# Patient Record
Sex: FEMALE | Race: WHITE
[De-identification: names, ages, dates, MRNs, and addresses within clinical notes are randomized per-mention and may not be internally consistent; named-entity substitution may affect disease eponyms.]

---

## 2018-12-05 ENCOUNTER — APPOINTMENT (OUTPATIENT)
Dept: ANTEPARTUM | Facility: CLINIC | Age: 38
End: 2018-12-05
Payer: MEDICAID

## 2018-12-05 ENCOUNTER — ASOB RESULT (OUTPATIENT)
Age: 38
End: 2018-12-05

## 2018-12-05 PROBLEM — Z00.00 ENCOUNTER FOR PREVENTIVE HEALTH EXAMINATION: Status: ACTIVE | Noted: 2018-12-05

## 2018-12-05 PROCEDURE — 76816 OB US FOLLOW-UP PER FETUS: CPT

## 2019-01-16 ENCOUNTER — ASOB RESULT (OUTPATIENT)
Age: 39
End: 2019-01-16

## 2019-01-16 ENCOUNTER — APPOINTMENT (OUTPATIENT)
Age: 39
End: 2019-01-16
Payer: MEDICAID

## 2019-01-16 PROCEDURE — 76817 TRANSVAGINAL US OBSTETRIC: CPT

## 2019-01-16 PROCEDURE — 76811 OB US DETAILED SNGL FETUS: CPT

## 2019-01-21 PROBLEM — Z82.49 FAMILY HISTORY OF HYPERTENSION: Status: ACTIVE | Noted: 2019-01-21

## 2019-01-21 RX ORDER — MULTIVIT-MIN/FOLIC/VIT K/LYCOP 400-300MCG
28-0.8 TABLET ORAL DAILY
Refills: 0 | Status: ACTIVE | COMMUNITY
Start: 2019-01-21

## 2019-01-22 ENCOUNTER — APPOINTMENT (OUTPATIENT)
Dept: ANTEPARTUM | Facility: CLINIC | Age: 39
End: 2019-01-22

## 2019-01-22 ENCOUNTER — APPOINTMENT (OUTPATIENT)
Dept: MATERNAL FETAL MEDICINE | Facility: CLINIC | Age: 39
End: 2019-01-22
Payer: MEDICAID

## 2019-01-22 VITALS
RESPIRATION RATE: 16 BRPM | WEIGHT: 209.38 LBS | OXYGEN SATURATION: 98 % | HEIGHT: 62 IN | HEART RATE: 89 BPM | DIASTOLIC BLOOD PRESSURE: 70 MMHG | SYSTOLIC BLOOD PRESSURE: 120 MMHG | BODY MASS INDEX: 38.53 KG/M2

## 2019-01-22 DIAGNOSIS — Z86.39 PERSONAL HISTORY OF OTHER ENDOCRINE, NUTRITIONAL AND METABOLIC DISEASE: ICD-10-CM

## 2019-01-22 DIAGNOSIS — Z82.49 FAMILY HISTORY OF ISCHEMIC HEART DISEASE AND OTHER DISEASES OF THE CIRCULATORY SYSTEM: ICD-10-CM

## 2019-01-22 DIAGNOSIS — Z86.79 PERSONAL HISTORY OF OTHER DISEASES OF THE CIRCULATORY SYSTEM: ICD-10-CM

## 2019-01-22 DIAGNOSIS — O99.210 OBESITY COMPLICATING PREGNANCY, UNSPECIFIED TRIMESTER: ICD-10-CM

## 2019-01-22 PROCEDURE — 99205 OFFICE O/P NEW HI 60 MIN: CPT | Mod: TH

## 2019-01-22 NOTE — DISCUSSION/SUMMARY
[FreeTextEntry1] : Increased synthroid to 50mcg daily.\par \par Repeat TFT today\par \par Followup is scheduled in 3-4 weeks for sonogram and reevaluation of the TFT

## 2019-01-22 NOTE — ACTIVE PROBLEMS
[Diabetes Mellitus] : no diabetes mellitus [Hypertension] : no hypertension [Heart Disease] : no heart disease [Autoimmune Disease] : no autoimmune disease [Renal Disease] : no kidney disease, no UTI [Neurologic Disorder] : no neurologic disorder, no epilepsy [Psychiatric Disorders] : no psychiatric disorders [Depression] : no depression, no post partum depression [Hepatic Disorder] : no hepatitis, no liver disease [Thrombophlebitis] : no varicosities, no phlebitis [Trauma] : no trauma/violence [Blood Transfusion (___ Ml)] : no history of blood transfusion

## 2019-01-22 NOTE — HISTORY OF PRESENT ILLNESS
[FreeTextEntry1] : Lizbet has a known history of hypopthyroidism, currently taking 25mcg synthroid daily.  Her most recent thyroid panel shows an slighly elevated TSH and normal free T4, however this was from over 2 months ago.

## 2019-01-22 NOTE — FAMILY HISTORY
[Age 35+ During Pregnancy] : not 35 or over during pregnancy [Reported Family History Of Birth Defects] : no congenital heart defects [Ryan-Sachs Carrier] : no Ryan-Sachs [Family History] : no mental retardation/autism [Reported Family History Of Genetic Disease] : no history of child defect in child of baby father

## 2019-01-22 NOTE — VITALS
[LMP (date): ___] : LMP was on [unfilled] [GA =___ Weeks] : which calculates to a GA of [unfilled] weeks [GA= ___ Days] : and [unfilled] day(s) [RICKY by LMP (date): ___] : The calculated RICKY by LMP is [unfilled] [By LMP] : this is the final RICKY

## 2019-01-22 NOTE — DATA REVIEWED
[FreeTextEntry1] : We reviewed all Desyis labs today, and ordered a repeat of the thyroid panel\par \par A followup sonogram is scheduled.

## 2019-01-22 NOTE — OB HISTORY
[Pregnancy History] : patient did not receive anesthesia [___] : no pregnancy complications reported [LMP: ___] : LMP: [unfilled] [RICKY: ___] : RICKY: [unfilled] [EGA: ___ wks] : EGA: [unfilled] wks [Spontaneous] : Spontaneous conception [Definite:  ___ (Date)] : the last menstrual period was [unfilled]

## 2019-01-23 LAB
T3FREE SERPL-MCNC: 2.77 PG/ML
T4 FREE SERPL-MCNC: 0.9 NG/DL
TSH SERPL-ACNC: 4.79 UIU/ML

## 2019-02-20 ENCOUNTER — ASOB RESULT (OUTPATIENT)
Age: 39
End: 2019-02-20

## 2019-02-20 ENCOUNTER — APPOINTMENT (OUTPATIENT)
Dept: MATERNAL FETAL MEDICINE | Facility: CLINIC | Age: 39
End: 2019-02-20
Payer: MEDICAID

## 2019-02-20 ENCOUNTER — APPOINTMENT (OUTPATIENT)
Age: 39
End: 2019-02-20

## 2019-02-20 DIAGNOSIS — E03.9 ENDOCRINE, NUTRITIONAL AND METABOLIC DISEASES COMPLICATING PREGNANCY, UNSPECIFIED TRIMESTER: ICD-10-CM

## 2019-02-20 DIAGNOSIS — O99.280 ENDOCRINE, NUTRITIONAL AND METABOLIC DISEASES COMPLICATING PREGNANCY, UNSPECIFIED TRIMESTER: ICD-10-CM

## 2019-02-20 PROCEDURE — 76816 OB US FOLLOW-UP PER FETUS: CPT

## 2019-02-20 PROCEDURE — 99215 OFFICE O/P EST HI 40 MIN: CPT | Mod: TH

## 2019-02-20 PROCEDURE — 76815 OB US LIMITED FETUS(S): CPT | Mod: 59

## 2019-02-20 NOTE — DATA REVIEWED
[FreeTextEntry1] : Sonogram today shows the baby at the 75th percentile. \par \par I reviewed the TFT with Lizbet.  She is currently Euthyroid.

## 2019-02-20 NOTE — HISTORY OF PRESENT ILLNESS
[FreeTextEntry1] : Lizbet is being followed for hypothyroidism, currently om 50mcg synthroid. Her most recent TFT shows borderline low thyroid function, and a followup TFT is warranted to decide if/when to increase the synthroid

## 2019-02-20 NOTE — DISCUSSION/SUMMARY
[FreeTextEntry1] : Repeat sonogram for growth in 4 weeks. Repeat TFTs to be drawn at that time. \par \par

## 2019-03-19 ENCOUNTER — APPOINTMENT (OUTPATIENT)
Dept: MATERNAL FETAL MEDICINE | Facility: CLINIC | Age: 39
End: 2019-03-19
Payer: MEDICAID

## 2019-03-19 ENCOUNTER — APPOINTMENT (OUTPATIENT)
Dept: ANTEPARTUM | Facility: CLINIC | Age: 39
End: 2019-03-19
Payer: MEDICAID

## 2019-03-19 ENCOUNTER — ASOB RESULT (OUTPATIENT)
Age: 39
End: 2019-03-19

## 2019-03-19 VITALS
BODY MASS INDEX: 39.38 KG/M2 | SYSTOLIC BLOOD PRESSURE: 122 MMHG | DIASTOLIC BLOOD PRESSURE: 68 MMHG | HEIGHT: 62 IN | HEART RATE: 88 BPM | OXYGEN SATURATION: 98 % | RESPIRATION RATE: 16 BRPM | WEIGHT: 214 LBS

## 2019-03-19 VITALS
WEIGHT: 214 LBS | DIASTOLIC BLOOD PRESSURE: 68 MMHG | HEART RATE: 88 BPM | BODY MASS INDEX: 39.38 KG/M2 | HEIGHT: 62 IN | SYSTOLIC BLOOD PRESSURE: 122 MMHG

## 2019-03-19 DIAGNOSIS — E66.9 OBESITY, UNSPECIFIED: ICD-10-CM

## 2019-03-19 PROCEDURE — 76816 OB US FOLLOW-UP PER FETUS: CPT

## 2019-03-19 PROCEDURE — 99214 OFFICE O/P EST MOD 30 MIN: CPT | Mod: TH

## 2019-03-19 PROCEDURE — 76819 FETAL BIOPHYS PROFIL W/O NST: CPT

## 2019-03-19 RX ORDER — LEVOTHYROXINE SODIUM 0.05 MG/1
50 TABLET ORAL DAILY
Qty: 30 | Refills: 2 | Status: DISCONTINUED | COMMUNITY
Start: 2019-01-22 | End: 2019-03-19

## 2019-03-19 NOTE — ACTIVE PROBLEMS
[Diabetes Mellitus] : no diabetes mellitus [Hypertension] : no hypertension [Autoimmune Disease] : no autoimmune disease [Heart Disease] : no heart disease [Neurologic Disorder] : no neurologic disorder, no epilepsy [Psychiatric Disorders] : no psychiatric disorders [Renal Disease] : no kidney disease, no UTI [Hepatic Disorder] : no hepatitis, no liver disease [Depression] : no depression, no post partum depression [Thrombophlebitis] : no varicosities, no phlebitis [Trauma] : no trauma/violence [Blood Transfusion (___ Ml)] : no history of blood transfusion

## 2019-03-19 NOTE — FAMILY HISTORY
[Age 35+ During Pregnancy] : not 35 or over during pregnancy [Reported Family History Of Birth Defects] : no neural tube defect [Ryan-Sachs Carrier] : no Ryan-Sachs [Family History] : no mental retardation/autism [Reported Family History Of Genetic Disease] : no history of child defect in child of baby father

## 2019-03-19 NOTE — HISTORY OF PRESENT ILLNESS
[FreeTextEntry1] : She told me she was diagnosed with hypothyroidism approximately 2 years ago. She was taking levothyroxine 25 mcg daily before pregnancy. The Levothyroxine medication was increased  to 50 mcg daily on January 22, 2019 due to prior thyroid function studies which showed an elevated TSH level and low-normal free T4 level.. She had thyroid function studies done on January 22, 2019 that reported an elevated TSH level and a free T4 level at the lower limit of normal. No subsequent thyroid function studies have been done

## 2019-03-19 NOTE — DISCUSSION/SUMMARY
[FreeTextEntry1] : She's 30 weeks by her last menstrual period dates.\par \par She is overweight and obesity has been associated with a number of maternal complications such as gestational diabetes, pre-eclampsia, thrombophlebitis, labor abnormalities, post-term pregnancies,  delivery, and operative complications. Obesity has been associated with adverse fetal outcomes such as late stillbirth and  deliveries.  Obese women also have a two to three-fold increased incidence in congenital anomalies. There were no major fetal malformations seen during the fetal anatomy ultrasound examination. She told me that she had a one-hour Glucola challenge test to screen for gestational diabetes last week and the results are pending.\par \par She is currently taking 50 mcg of levothyroxine daily. I told her that pregnancies complicated by hypothyroidism are at an increased risk for stillbirths. She denies feeling tired or fatigue, having constipation, recent weight gain, and having severe dry skin. She was advised to continue have serial thyroid function studies during pregnancy to document that she is euthyroid or adjust her levothyroxine medication. I ordered a free T4, free T3, and TSH level.\par \par Regarding her advanced maternal age, she did not have genetic counseling.  She told me that she does not remember being informed of her risks for having a baby with Down syndrome. she had test done\par \par I told her that advanced maternal age has been associated with a higher incidence of gestational diabetes, and preeclampsia /eclampsia. She should be closely monitored for these conditions during pregnancy.  I also told her that advanced maternal age has been associated with an increased risk of stillbirth, and therefore, I recommend delivery during the 39 week of gestation in the event she has not given birth by 39 weeks of gestation. \par \par \par

## 2019-03-19 NOTE — PAST MEDICAL HISTORY
[HIV Infection] : no HIV [Chlamydial Infections] : no chlamydia [Syphilis] : no syphilis [Exposure To Gonorrhea] : no gonorrhea [Hepatitis, B Virus] : no Hepatitis B [Human Papilloma Virus Infection] : no genital warts [Herpes Simplex] : no genital herpes [Trichomoniasis] : no trichomoniasis [Hepatitis, C Virus] : no Hepatitis C

## 2019-03-19 NOTE — VITALS
[LMP (date): ___] : LMP was on [unfilled] [GA =___ Weeks] : which calculates to a GA of [unfilled] weeks [RICKY by LMP (date): ___] : The calculated RICKY by LMP is [unfilled] [GA= ___ Days] : and [unfilled] day(s) [By LMP] : this is the final RICKY

## 2019-03-19 NOTE — OB HISTORY
[LMP: ___] : LMP: [unfilled] [RICKY: ___] : RICKY: [unfilled] [EGA: ___ wks] : EGA: [unfilled] wks [Spontaneous] : Spontaneous conception [Definite:  ___ (Date)] : the last menstrual period was [unfilled] [___] : no pregnancy complications reported [Pregnancy History] : patient did not receive anesthesia [FreeTextEntry1] : Prenatal records were not available for my review.\par \par She had two prior MFM consultations in this office with Dr. Guerra (January 22, 2019 and February 20, 2019).

## 2019-03-19 NOTE — SURGICAL HISTORY
[Last Pap: ___] : Last Pap: [unfilled] [Fibroids] : no fibroids [Breast Disease] : no breast disease [Abn Paps] : no abnormal pap smears [STI's] : no STI's [Infertility] : no infertility [Cysts] : no cysts [OC Use] : no OC use [Last Mammo: ___] : Last Mammo: none spouse

## 2019-03-20 ENCOUNTER — MEDICATION RENEWAL (OUTPATIENT)
Age: 39
End: 2019-03-20

## 2019-03-20 ENCOUNTER — OTHER (OUTPATIENT)
Age: 39
End: 2019-03-20

## 2019-03-20 ENCOUNTER — APPOINTMENT (OUTPATIENT)
Dept: ANTEPARTUM | Facility: CLINIC | Age: 39
End: 2019-03-20

## 2019-03-20 ENCOUNTER — APPOINTMENT (OUTPATIENT)
Dept: MATERNAL FETAL MEDICINE | Facility: CLINIC | Age: 39
End: 2019-03-20

## 2019-03-20 LAB
T3FREE SERPL-MCNC: 2.93 PG/ML
T4 FREE SERPL-MCNC: 0.8 NG/DL
TSH SERPL-ACNC: 3.35 UIU/ML

## 2019-03-20 RX ORDER — LEVOTHYROXINE SODIUM 0.07 MG/1
75 TABLET ORAL DAILY
Qty: 30 | Refills: 2 | Status: ACTIVE | COMMUNITY
Start: 2019-03-20 | End: 1900-01-01

## 2019-04-01 ENCOUNTER — OUTPATIENT (OUTPATIENT)
Dept: OUTPATIENT SERVICES | Facility: HOSPITAL | Age: 39
LOS: 1 days | End: 2019-04-01
Payer: MEDICAID

## 2019-04-01 PROCEDURE — G9001: CPT

## 2019-04-16 ENCOUNTER — APPOINTMENT (OUTPATIENT)
Dept: ANTEPARTUM | Facility: CLINIC | Age: 39
End: 2019-04-16
Payer: MEDICAID

## 2019-04-16 ENCOUNTER — ASOB RESULT (OUTPATIENT)
Age: 39
End: 2019-04-16

## 2019-04-16 ENCOUNTER — APPOINTMENT (OUTPATIENT)
Dept: MATERNAL FETAL MEDICINE | Facility: CLINIC | Age: 39
End: 2019-04-16
Payer: MEDICAID

## 2019-04-16 VITALS
BODY MASS INDEX: 39.56 KG/M2 | DIASTOLIC BLOOD PRESSURE: 78 MMHG | WEIGHT: 215 LBS | RESPIRATION RATE: 18 BRPM | HEIGHT: 62 IN | HEART RATE: 82 BPM | OXYGEN SATURATION: 99 % | SYSTOLIC BLOOD PRESSURE: 124 MMHG

## 2019-04-16 DIAGNOSIS — O09.523 SUPERVISION OF ELDERLY MULTIGRAVIDA, THIRD TRIMESTER: ICD-10-CM

## 2019-04-16 PROCEDURE — 99213 OFFICE O/P EST LOW 20 MIN: CPT | Mod: TH

## 2019-04-16 PROCEDURE — 76816 OB US FOLLOW-UP PER FETUS: CPT

## 2019-04-16 PROCEDURE — 76819 FETAL BIOPHYS PROFIL W/O NST: CPT

## 2019-04-16 RX ORDER — LEVOTHYROXINE SODIUM 0.03 MG/1
25 TABLET ORAL
Refills: 0 | Status: DISCONTINUED | COMMUNITY
End: 2019-04-16

## 2019-04-16 NOTE — VITALS
[GA =___ Weeks] : which calculates to a GA of [unfilled] weeks [GA= ___ Days] : and [unfilled] day(s) [RICKY by LMP (date): ___] : The calculated RICKY by LMP is [unfilled] [By LMP] : this is the final RICKY [LMP (date): ___] : LMP was on [unfilled]

## 2019-04-16 NOTE — OB HISTORY
[LMP: ___] : LMP: [unfilled] [RICKY: ___] : RICKY: [unfilled] [EGA: ___ wks] : EGA: [unfilled] wks [Spontaneous] : Spontaneous conception [Definite:  ___ (Date)] : the last menstrual period was [unfilled] [Pregnancy History] : patient did not receive anesthesia [___] : no pregnancy complications reported [FreeTextEntry1] : Prenatal records were not available for my review.\par \par She had two prior MFM consultations in this office with Dr. Guerra (January 22, 2019 and February 20, 2019).

## 2019-04-16 NOTE — DISCUSSION/SUMMARY
[FreeTextEntry1] : She's 34 weeks by her last menstrual period dates.\par \par She was seen by me on March 19, 2019. I ordered thyroid function studies which reported a free T4 level of 0.8 (low free T4). She was advised to increase the levothyroxine dose to 75 mcg daily on March 20, 2018. She told me that she has been taking 75 mcg of levothyroxine each day as recommended. She having constipation, recent weight gain, and having severe dry skin. She was advised to continue having serial thyroid function studies during pregnancy to document that she is euthyroid or adjust her levothyroxine medication. I ordered a free T4, free T3, and TSH level.\par \par She was advised to start weekly fetal testing at 36 weeks due to her hypothyroidism, advanced maternal age, and obesity. She was advised to have a fetal growth ultrasound examination in approximately 3-4 weeks.\par \par \par \par

## 2019-04-16 NOTE — FAMILY HISTORY
[Age 35+ During Pregnancy] : not 35 or over during pregnancy [Reported Family History Of Birth Defects] : no congenital heart defects [Ryan-Sachs Carrier] : no Ryan-Sachs [Family History] : no mental retardation/autism [Reported Family History Of Genetic Disease] : no maternal metabolic disorder

## 2019-04-17 DIAGNOSIS — Z71.89 OTHER SPECIFIED COUNSELING: ICD-10-CM

## 2019-04-30 ENCOUNTER — APPOINTMENT (OUTPATIENT)
Dept: ANTEPARTUM | Facility: CLINIC | Age: 39
End: 2019-04-30

## 2019-05-08 ENCOUNTER — APPOINTMENT (OUTPATIENT)
Dept: ANTEPARTUM | Facility: CLINIC | Age: 39
End: 2019-05-08
Payer: MEDICAID

## 2019-05-08 ENCOUNTER — ASOB RESULT (OUTPATIENT)
Age: 39
End: 2019-05-08

## 2019-05-08 PROCEDURE — 76818 FETAL BIOPHYS PROFILE W/NST: CPT

## 2019-05-15 ENCOUNTER — APPOINTMENT (OUTPATIENT)
Dept: ANTEPARTUM | Facility: CLINIC | Age: 39
End: 2019-05-15
Payer: MEDICAID

## 2019-05-15 ENCOUNTER — APPOINTMENT (OUTPATIENT)
Dept: MATERNAL FETAL MEDICINE | Facility: CLINIC | Age: 39
End: 2019-05-15
Payer: MEDICAID

## 2019-05-15 ENCOUNTER — ASOB RESULT (OUTPATIENT)
Age: 39
End: 2019-05-15

## 2019-05-15 VITALS
BODY MASS INDEX: 39.56 KG/M2 | SYSTOLIC BLOOD PRESSURE: 100 MMHG | RESPIRATION RATE: 18 BRPM | HEART RATE: 78 BPM | HEIGHT: 62 IN | DIASTOLIC BLOOD PRESSURE: 58 MMHG | WEIGHT: 215 LBS | OXYGEN SATURATION: 98 %

## 2019-05-15 VITALS
RESPIRATION RATE: 18 BRPM | OXYGEN SATURATION: 98 % | HEIGHT: 62 IN | HEART RATE: 78 BPM | DIASTOLIC BLOOD PRESSURE: 58 MMHG | SYSTOLIC BLOOD PRESSURE: 100 MMHG

## 2019-05-15 DIAGNOSIS — O99.283 ENDOCRINE, NUTRITIONAL AND METABOLIC DISEASES COMPLICATING PREGNANCY, THIRD TRIMESTER: ICD-10-CM

## 2019-05-15 DIAGNOSIS — O99.213 OBESITY COMPLICATING PREGNANCY, THIRD TRIMESTER: ICD-10-CM

## 2019-05-15 DIAGNOSIS — E03.9 ENDOCRINE, NUTRITIONAL AND METABOLIC DISEASES COMPLICATING PREGNANCY, THIRD TRIMESTER: ICD-10-CM

## 2019-05-15 DIAGNOSIS — O09.529 SUPERVISION OF ELDERLY MULTIGRAVIDA, UNSPECIFIED TRIMESTER: ICD-10-CM

## 2019-05-15 DIAGNOSIS — Z3A.38 38 WEEKS GESTATION OF PREGNANCY: ICD-10-CM

## 2019-05-15 PROCEDURE — 76816 OB US FOLLOW-UP PER FETUS: CPT

## 2019-05-15 PROCEDURE — 99214 OFFICE O/P EST MOD 30 MIN: CPT | Mod: TH

## 2019-05-15 PROCEDURE — 76819 FETAL BIOPHYS PROFIL W/O NST: CPT

## 2019-05-15 RX ORDER — CHLORHEXIDINE GLUCONATE 4 %
325 (65 FE) LIQUID (ML) TOPICAL
Refills: 0 | Status: ACTIVE | COMMUNITY

## 2019-05-15 NOTE — FAMILY HISTORY
[Age 35+ During Pregnancy] : not 35 or over during pregnancy [Ryan-Sachs Carrier] : no Ryan-Sachs [Reported Family History Of Birth Defects] : no congenital heart defects [Reported Family History Of Genetic Disease] : no maternal metabolic disorder [Family History] : no mental retardation/autism

## 2019-05-15 NOTE — SURGICAL HISTORY
[Last Pap: ___] : Last Pap: [unfilled] [Fibroids] : no fibroids [Abn Paps] : no abnormal pap smears [STI's] : no STI's [Breast Disease] : no breast disease [Infertility] : no infertility [OC Use] : no OC use [Last Mammo: ___] : Last Mammo: none [Cysts] : no cysts

## 2019-05-15 NOTE — VITALS
[LMP (date): ___] : LMP was on [unfilled] [RICKY by LMP (date): ___] : The calculated RICKY by LMP is [unfilled] [By LMP] : this is the final RICKY [GA =___ Weeks] : which calculates to a GA of [unfilled] weeks [GA= ___ Days] : and [unfilled] day(s)

## 2019-05-15 NOTE — PAST MEDICAL HISTORY
[Exposure To Gonorrhea] : no gonorrhea [Chlamydial Infections] : no chlamydia [HIV Infection] : no HIV [Human Papilloma Virus Infection] : no genital warts [Syphilis] : no syphilis [Herpes Simplex] : no genital herpes [Trichomoniasis] : no trichomoniasis [Hepatitis, B Virus] : no Hepatitis B [Hepatitis, C Virus] : no Hepatitis C

## 2019-05-15 NOTE — OB HISTORY
[RICKY: ___] : RICKY: [unfilled] [LMP: ___] : LMP: [unfilled] [Spontaneous] : Spontaneous conception [Definite:  ___ (Date)] : the last menstrual period was [unfilled] [Pregnancy History] : patient did not receive anesthesia [___] : no pregnancy complications reported [FreeTextEntry1] : Prenatal records were not available for my review.\par \par She had two MFM consultations in this office with Dr. Guerra (January 22, 2019 and February 20, 2019).  [EGA: ___ wks] : EGA: [unfilled] wks

## 2019-05-15 NOTE — ACTIVE PROBLEMS
[Diabetes Mellitus] : no diabetes mellitus [Heart Disease] : no heart disease [Hypertension] : no hypertension [Autoimmune Disease] : no autoimmune disease [Neurologic Disorder] : no neurologic disorder, no epilepsy [Renal Disease] : no kidney disease, no UTI [Hepatic Disorder] : no hepatitis, no liver disease [Depression] : no depression, no post partum depression [Psychiatric Disorders] : no psychiatric disorders [Thrombophlebitis] : no varicosities, no phlebitis [Blood Transfusion (___ Ml)] : no history of blood transfusion [Trauma] : no trauma/violence

## 2019-05-15 NOTE — HISTORY OF PRESENT ILLNESS
[FreeTextEntry1] : She told me she was diagnosed with hypothyroidism approximately 2 years ago. She was taking levothyroxine 25 mcg daily before pregnancy. The Levothyroxine medication was increased  to 50 mcg daily on January 22, 2019 due to prior thyroid function studies which showed an elevated TSH level and low-normal free T4 level.. She had thyroid function studies done on January 22, 2019 that reported an elevated TSH level and a free T4 level at the lower limit of normal.

## 2019-05-15 NOTE — DISCUSSION/SUMMARY
[FreeTextEntry1] : She's 38 weeks and 1 day gestation by her last menstrual period dates.\par \par She was seen by me on March 19, 2019 and April 16, 2019. She increased the levothyroxine dose to 75 mcg daily on March 20, 2018. She told me that she has been taking 75 mcg of levothyroxine each day as recommended. Thyroid function studies done on April 18, 2019 reported a normal free T4, free T3, and TSH level. She denies having constipation, recent weight gain, and having severe dry skin. She was advised to continue having serial thyroid function studies during pregnancy to document that she is euthyroid or adjust her levothyroxine medication. I ordered a free T4, free T3, and TSH level.\par \par She had an ultrasound examination today which reported an estimated fetal weight of 8 pounds or 3629 grams. A biophysical profile score was 8 out of 8 which is suggestive of a healthy fetus. She was advised to continue weekly fetal testing until delivery due to her hypothyroidism, advanced maternal age, and obesity.  I also told her that advanced maternal age has been associated with an increased risk of stillbirth, and therefore, I recommend delivery during the 39 week of gestation in the event she has not given birth by 39 weeks of gestation. All her questions were answered.\par \par \par \par

## 2019-05-21 ENCOUNTER — APPOINTMENT (OUTPATIENT)
Dept: ANTEPARTUM | Facility: CLINIC | Age: 39
End: 2019-05-21
Payer: MEDICAID

## 2019-05-21 ENCOUNTER — ASOB RESULT (OUTPATIENT)
Age: 39
End: 2019-05-21

## 2019-05-21 PROCEDURE — 76818 FETAL BIOPHYS PROFILE W/NST: CPT

## 2019-05-21 PROCEDURE — 76815 OB US LIMITED FETUS(S): CPT

## 2019-05-22 ENCOUNTER — INPATIENT (INPATIENT)
Facility: HOSPITAL | Age: 39
LOS: 2 days | Discharge: HOME | End: 2019-05-25
Attending: OBSTETRICS & GYNECOLOGY | Admitting: OBSTETRICS & GYNECOLOGY
Payer: COMMERCIAL

## 2019-05-22 VITALS — DIASTOLIC BLOOD PRESSURE: 79 MMHG | HEART RATE: 78 BPM | SYSTOLIC BLOOD PRESSURE: 121 MMHG

## 2019-05-22 DIAGNOSIS — E03.9 HYPOTHYROIDISM, UNSPECIFIED: ICD-10-CM

## 2019-05-22 DIAGNOSIS — O26.893 OTHER SPECIFIED PREGNANCY RELATED CONDITIONS, THIRD TRIMESTER: ICD-10-CM

## 2019-05-22 LAB
APPEARANCE UR: CLEAR — SIGNIFICANT CHANGE UP
BACTERIA # UR AUTO: NEGATIVE — SIGNIFICANT CHANGE UP
BASOPHILS # BLD AUTO: 0 K/UL — SIGNIFICANT CHANGE UP (ref 0–0.2)
BASOPHILS NFR BLD AUTO: 0.2 % — SIGNIFICANT CHANGE UP (ref 0–2)
BILIRUB UR-MCNC: NEGATIVE — SIGNIFICANT CHANGE UP
BLD GP AB SCN SERPL QL: SIGNIFICANT CHANGE UP
COLOR SPEC: YELLOW — SIGNIFICANT CHANGE UP
DIFF PNL FLD: NEGATIVE — SIGNIFICANT CHANGE UP
EOSINOPHIL # BLD AUTO: 0.1 K/UL — SIGNIFICANT CHANGE UP (ref 0–0.5)
EOSINOPHIL NFR BLD AUTO: 0.6 % — SIGNIFICANT CHANGE UP (ref 0–6)
EPI CELLS # UR: SIGNIFICANT CHANGE UP
GLUCOSE UR QL: NEGATIVE MG/DL — SIGNIFICANT CHANGE UP
HCT VFR BLD CALC: 34.6 % — LOW (ref 37–47)
HGB BLD-MCNC: 11.3 G/DL — LOW (ref 12–16)
KETONES UR-MCNC: NEGATIVE — SIGNIFICANT CHANGE UP
LEUKOCYTE ESTERASE UR-ACNC: ABNORMAL
LYMPHOCYTES # BLD AUTO: 2.1 K/UL — SIGNIFICANT CHANGE UP (ref 1–4.8)
LYMPHOCYTES # BLD AUTO: 21.7 % — SIGNIFICANT CHANGE UP (ref 20–55)
MCHC RBC-ENTMCNC: 29 PG — SIGNIFICANT CHANGE UP (ref 27–31)
MCHC RBC-ENTMCNC: 32.7 G/DL — SIGNIFICANT CHANGE UP (ref 32–36)
MCV RBC AUTO: 88.9 FL — SIGNIFICANT CHANGE UP (ref 81–99)
MONOCYTES # BLD AUTO: 1 K/UL — HIGH (ref 0–0.8)
MONOCYTES NFR BLD AUTO: 9.9 % — SIGNIFICANT CHANGE UP (ref 3–10)
NEUTROPHILS # BLD AUTO: 6.6 K/UL — SIGNIFICANT CHANGE UP (ref 1.8–8)
NEUTROPHILS NFR BLD AUTO: 67.3 % — SIGNIFICANT CHANGE UP (ref 37–73)
NITRITE UR-MCNC: NEGATIVE — SIGNIFICANT CHANGE UP
PH UR: 7 — SIGNIFICANT CHANGE UP (ref 5–8)
PLATELET # BLD AUTO: 255 K/UL — SIGNIFICANT CHANGE UP (ref 150–400)
PROT UR-MCNC: NEGATIVE MG/DL — SIGNIFICANT CHANGE UP
RBC # BLD: 3.89 M/UL — LOW (ref 4.4–5.2)
RBC # FLD: 15.5 % — SIGNIFICANT CHANGE UP (ref 11–15.6)
RBC CASTS # UR COMP ASSIST: NEGATIVE /HPF — SIGNIFICANT CHANGE UP (ref 0–4)
SP GR SPEC: 1 — LOW (ref 1.01–1.02)
TYPE + AB SCN PNL BLD: SIGNIFICANT CHANGE UP
UROBILINOGEN FLD QL: NEGATIVE MG/DL — SIGNIFICANT CHANGE UP
WBC # BLD: 9.8 K/UL — SIGNIFICANT CHANGE UP (ref 4.8–10.8)
WBC # FLD AUTO: 9.8 K/UL — SIGNIFICANT CHANGE UP (ref 4.8–10.8)
WBC UR QL: SIGNIFICANT CHANGE UP

## 2019-05-22 RX ORDER — LEVOTHYROXINE SODIUM 125 MCG
75 TABLET ORAL DAILY
Refills: 0 | Status: DISCONTINUED | OUTPATIENT
Start: 2019-05-23 | End: 2019-05-25

## 2019-05-22 RX ORDER — CITRIC ACID/SODIUM CITRATE 300-500 MG
30 SOLUTION, ORAL ORAL ONCE
Refills: 0 | Status: DISCONTINUED | OUTPATIENT
Start: 2019-05-22 | End: 2019-05-23

## 2019-05-22 RX ORDER — LEVOTHYROXINE SODIUM 125 MCG
1 TABLET ORAL
Qty: 0 | Refills: 0 | DISCHARGE

## 2019-05-22 RX ORDER — LEVOTHYROXINE SODIUM 125 MCG
75 TABLET ORAL
Qty: 0 | Refills: 0 | DISCHARGE

## 2019-05-22 RX ORDER — SODIUM CHLORIDE 9 MG/ML
1000 INJECTION, SOLUTION INTRAVENOUS
Refills: 0 | Status: DISCONTINUED | OUTPATIENT
Start: 2019-05-22 | End: 2019-05-23

## 2019-05-22 RX ORDER — OXYTOCIN 10 UNIT/ML
333.33 VIAL (ML) INJECTION
Qty: 20 | Refills: 0 | Status: COMPLETED | OUTPATIENT
Start: 2019-05-22 | End: 2019-05-22

## 2019-05-22 RX ADMIN — SODIUM CHLORIDE 125 MILLILITER(S): 9 INJECTION, SOLUTION INTRAVENOUS at 09:00

## 2019-05-22 NOTE — OB RN PATIENT PROFILE - NSNAMEOFMDOFFICE_OBGYN_ALL_OB_FT

## 2019-05-22 NOTE — CHART NOTE - NSCHARTNOTEFT_GEN_A_CORE
Vital Signs Last 24 Hrs  T(C): 37.1 (22 May 2019 08:55), Max: 37.1 (22 May 2019 08:55)  T(F): 98.8 (22 May 2019 08:55), Max: 98.8 (22 May 2019 08:55)  HR: 78 (22 May 2019 08:55) (78 - 78)  BP: 121/79 (22 May 2019 08:55) (121/79 - 121/79)  RR: 18 (22 May 2019 08:55) (18 - 18)      FETAL HEART RATE   fetal heart rate reactive with occasional contractions       CERVICAL EXAM:  closed/long/posterior  cytotec #1 given at 1025    PAIN SCALE (0-10): 2    PLAN:  1. continue cytotec

## 2019-05-22 NOTE — CHART NOTE - NSCHARTNOTEFT_GEN_A_CORE
38 y/o  at 39w1d admitted for IOL.   Patient examined at bedside, feeling minimal pain.    Vital Signs Last 24 Hrs  T(C): 36.8 (22 May 2019 13:37), Max: 37.1 (22 May 2019 08:55)  T(F): 98.24 (22 May 2019 13:37), Max: 98.8 (22 May 2019 08:55)  HR: 82 (22 May 2019 13:37) (78 - 82)  BP: 120/68 (22 May 2019 13:37) (120/68 - 121/79)  BP(mean): --  RR: 18 (22 May 2019 13:37) (18 - 18)  SpO2: --    FHT: baseline 145, moderate variability, no accels or decels   [place]: minimal uterine activity   SVE: fingertip     A/P: Will continue to monitor FHT/[place] and reassess for cervical change when clinically indicated. 38 y/o  at 39w1d admitted for IOL.   Patient examined at bedside, feeling minimal pain.    Vital Signs Last 24 Hrs  T(C): 36.8 (22 May 2019 13:37), Max: 37.1 (22 May 2019 08:55)  T(F): 98.24 (22 May 2019 13:37), Max: 98.8 (22 May 2019 08:55)  HR: 82 (22 May 2019 13:37) (78 - 82)  BP: 120/68 (22 May 2019 13:37) (120/68 - 121/79)  BP(mean): --  RR: 18 (22 May 2019 13:37) (18 - 18)  SpO2: --    FHT: baseline 145, moderate variability, no accels or decels   [place]: minimal uterine activity   SVE: 1-2/50/-2  Intracervical balloon placed with 60ml.    A/P: Will continue to monitor FHT/[place] and reassess for cervical change when clinically indicated.

## 2019-05-22 NOTE — OB RN PATIENT PROFILE - CENTRAL VENOUS CATHETER
----- Message from Vale Gore sent at 2017  2:33 PM CDT -----  Contact: Kwadwo Fuller mom [phone]  Mom is requesting a call back from Dr Wilson because she wanted to see if the likes the breast milk or the formula and mom said that he likes the prosobee better. Mom wants to use the prosobee. Mom will need a [employer] 48 for the prosobee.  
----- Message from Vale Gore sent at 2017  2:36 PM CDT -----  Contact: Kwadwo Fuller mom [phone]  Mom is requesting a call back from the nurse because when her son came in for 4 months they were out of the vaccines and mom wants to know if they came in and if she can book an appt. Please call       Spoke with mom to schedule nurse visit for PCV 13 and rotateq, appt made. Mom requested time and date. Mom also stated okay and thank you.  
no

## 2019-05-22 NOTE — OB PROVIDER H&P - ASSESSMENT
39y y/o  @ 39 weeks    Pmhx- hypothyroid  pshx- none  Pobhx- 2 ft   Pgynhx- no std's  Social- neg x 3  No Known Allergies    T(C): 37.1 (19 @ 08:55), Max: 37.1 (19 @ 08:55)  HR: 78 (19 @ 08:55) (78 - 78)  BP: 121/79 (19 @ 08:55) (121/79 - 121/79)  RR: 18 (19 @ 08:55) (18 - 18)    cervix closed/long/posterior  gbs negative  sono shows vertex fetus

## 2019-05-22 NOTE — CHART NOTE - NSCHARTNOTEFT_GEN_A_CORE
38 y/o  at 39w1d admitted for IOL  Says that she felt increased pressure but not with any pain currently       Vital Signs Last 24 Hrs  T(C): 37.0 (22 May 2019 19:43), Max: 37.1 (22 May 2019 08:55)  T(F): 98.6 (22 May 2019 19:43), Max: 98.8 (22 May 2019 08:55)  HR: 78 (22 May 2019 19:43) (78 - 84)  BP: 123/77 (22 May 2019 19:43) (120/68 - 123/77)  BP(mean): --  RR: 17 (22 May 2019 19:43) (17 - 18)  SpO2: --    FHT: baseline 145, min variability, no accels or decels   [place]: q1-2 apart ctx    SVE: 3.5cm/50/-2      A/P:   -Cytotec was held due to increased contractions  -Will continue to monitor FHT/[place] and reassess for cervical change when clinically indicated.

## 2019-05-23 ENCOUNTER — TRANSCRIPTION ENCOUNTER (OUTPATIENT)
Age: 39
End: 2019-05-23

## 2019-05-23 LAB
MEV IGG SER-ACNC: 16.6 AU/ML — SIGNIFICANT CHANGE UP
MEV IGG+IGM SER-IMP: NEGATIVE — SIGNIFICANT CHANGE UP
T PALLIDUM AB TITR SER: NEGATIVE — SIGNIFICANT CHANGE UP

## 2019-05-23 RX ORDER — ACETAMINOPHEN 500 MG
975 TABLET ORAL EVERY 6 HOURS
Refills: 0 | Status: DISCONTINUED | OUTPATIENT
Start: 2019-05-23 | End: 2019-05-25

## 2019-05-23 RX ORDER — OXYTOCIN 10 UNIT/ML
333.33 VIAL (ML) INJECTION
Qty: 20 | Refills: 0 | Status: DISCONTINUED | OUTPATIENT
Start: 2019-05-23 | End: 2019-05-25

## 2019-05-23 RX ORDER — LANOLIN
1 OINTMENT (GRAM) TOPICAL EVERY 6 HOURS
Refills: 0 | Status: DISCONTINUED | OUTPATIENT
Start: 2019-05-23 | End: 2019-05-25

## 2019-05-23 RX ORDER — HYDROCORTISONE 1 %
1 OINTMENT (GRAM) TOPICAL EVERY 6 HOURS
Refills: 0 | Status: DISCONTINUED | OUTPATIENT
Start: 2019-05-23 | End: 2019-05-25

## 2019-05-23 RX ORDER — BENZOCAINE 10 %
1 GEL (GRAM) MUCOUS MEMBRANE EVERY 6 HOURS
Refills: 0 | Status: DISCONTINUED | OUTPATIENT
Start: 2019-05-23 | End: 2019-05-25

## 2019-05-23 RX ORDER — GLYCERIN ADULT
1 SUPPOSITORY, RECTAL RECTAL AT BEDTIME
Refills: 0 | Status: DISCONTINUED | OUTPATIENT
Start: 2019-05-23 | End: 2019-05-25

## 2019-05-23 RX ORDER — SODIUM CHLORIDE 9 MG/ML
3 INJECTION INTRAMUSCULAR; INTRAVENOUS; SUBCUTANEOUS EVERY 8 HOURS
Refills: 0 | Status: DISCONTINUED | OUTPATIENT
Start: 2019-05-23 | End: 2019-05-25

## 2019-05-23 RX ORDER — DOCUSATE SODIUM 100 MG
100 CAPSULE ORAL
Refills: 0 | Status: DISCONTINUED | OUTPATIENT
Start: 2019-05-23 | End: 2019-05-25

## 2019-05-23 RX ORDER — KETOROLAC TROMETHAMINE 30 MG/ML
30 SYRINGE (ML) INJECTION ONCE
Refills: 0 | Status: DISCONTINUED | OUTPATIENT
Start: 2019-05-23 | End: 2019-05-23

## 2019-05-23 RX ORDER — DIBUCAINE 1 %
1 OINTMENT (GRAM) RECTAL EVERY 6 HOURS
Refills: 0 | Status: DISCONTINUED | OUTPATIENT
Start: 2019-05-23 | End: 2019-05-25

## 2019-05-23 RX ORDER — IBUPROFEN 200 MG
600 TABLET ORAL EVERY 6 HOURS
Refills: 0 | Status: DISCONTINUED | OUTPATIENT
Start: 2019-05-23 | End: 2019-05-25

## 2019-05-23 RX ORDER — AER TRAVELER 0.5 G/1
1 SOLUTION RECTAL; TOPICAL EVERY 4 HOURS
Refills: 0 | Status: DISCONTINUED | OUTPATIENT
Start: 2019-05-23 | End: 2019-05-25

## 2019-05-23 RX ORDER — MAGNESIUM HYDROXIDE 400 MG/1
30 TABLET, CHEWABLE ORAL
Refills: 0 | Status: DISCONTINUED | OUTPATIENT
Start: 2019-05-23 | End: 2019-05-25

## 2019-05-23 RX ORDER — BUTORPHANOL TARTRATE 2 MG/ML
1 INJECTION, SOLUTION INTRAMUSCULAR; INTRAVENOUS ONCE
Refills: 0 | Status: DISCONTINUED | OUTPATIENT
Start: 2019-05-23 | End: 2019-05-23

## 2019-05-23 RX ORDER — TETANUS TOXOID, REDUCED DIPHTHERIA TOXOID AND ACELLULAR PERTUSSIS VACCINE, ADSORBED 5; 2.5; 8; 8; 2.5 [IU]/.5ML; [IU]/.5ML; UG/.5ML; UG/.5ML; UG/.5ML
0.5 SUSPENSION INTRAMUSCULAR ONCE
Refills: 0 | Status: DISCONTINUED | OUTPATIENT
Start: 2019-05-23 | End: 2019-05-25

## 2019-05-23 RX ORDER — IBUPROFEN 200 MG
600 TABLET ORAL EVERY 6 HOURS
Refills: 0 | Status: COMPLETED | OUTPATIENT
Start: 2019-05-23 | End: 2020-04-20

## 2019-05-23 RX ORDER — SIMETHICONE 80 MG/1
80 TABLET, CHEWABLE ORAL EVERY 4 HOURS
Refills: 0 | Status: DISCONTINUED | OUTPATIENT
Start: 2019-05-23 | End: 2019-05-25

## 2019-05-23 RX ORDER — PRAMOXINE HYDROCHLORIDE 150 MG/15G
1 AEROSOL, FOAM RECTAL EVERY 4 HOURS
Refills: 0 | Status: DISCONTINUED | OUTPATIENT
Start: 2019-05-23 | End: 2019-05-25

## 2019-05-23 RX ORDER — DIPHENHYDRAMINE HCL 50 MG
25 CAPSULE ORAL EVERY 6 HOURS
Refills: 0 | Status: DISCONTINUED | OUTPATIENT
Start: 2019-05-23 | End: 2019-05-25

## 2019-05-23 RX ADMIN — Medication 600 MILLIGRAM(S): at 17:45

## 2019-05-23 RX ADMIN — SODIUM CHLORIDE 125 MILLILITER(S): 9 INJECTION, SOLUTION INTRAVENOUS at 07:21

## 2019-05-23 RX ADMIN — Medication 1000 MILLIUNIT(S)/MIN: at 08:49

## 2019-05-23 RX ADMIN — BUTORPHANOL TARTRATE 1 MILLIGRAM(S): 2 INJECTION, SOLUTION INTRAMUSCULAR; INTRAVENOUS at 04:25

## 2019-05-23 RX ADMIN — BUTORPHANOL TARTRATE 1 MILLIGRAM(S): 2 INJECTION, SOLUTION INTRAMUSCULAR; INTRAVENOUS at 04:40

## 2019-05-23 RX ADMIN — Medication 30 MILLIGRAM(S): at 09:41

## 2019-05-23 RX ADMIN — Medication 600 MILLIGRAM(S): at 23:44

## 2019-05-23 RX ADMIN — Medication 75 MICROGRAM(S): at 06:38

## 2019-05-23 RX ADMIN — Medication 975 MILLIGRAM(S): at 22:16

## 2019-05-23 RX ADMIN — Medication 30 MILLIGRAM(S): at 10:11

## 2019-05-23 RX ADMIN — Medication 975 MILLIGRAM(S): at 21:16

## 2019-05-23 RX ADMIN — Medication 0.2 MILLIGRAM(S): at 09:00

## 2019-05-23 RX ADMIN — Medication 600 MILLIGRAM(S): at 18:35

## 2019-05-23 NOTE — DISCHARGE NOTE OB - HOSPITAL COURSE
Uncomplicated hospital course. At the time of discharge patient was tolerating a regular diet, ambulating without assistance, voiding spontaneously and pain was well controlled with PRN medications. Patient aware of plan to follow up with her OB 4-6 weeks after discharge.

## 2019-05-23 NOTE — OB RN DELIVERY SUMMARY - NS_DELIVERYASSIST1_OBGYN_ALL_OB_FT
- likely with another aspiration event  - comfort measures as per family  - will cont IV abx  - IV dilaudid PRN dyspnea  - supplemental NRB as needed  - Robinol for secretions Yvonne Bermudez - off tube feeds, no respiratory distress  - comfort measures as per family, Dilaudid and Robinul PRN  - will cont IV abx  - supplemental NRB as needed

## 2019-05-23 NOTE — DISCHARGE NOTE OB - CARE PLAN
Principal Discharge DX:	 (normal spontaneous vaginal delivery)  Goal:	Delivery  Assessment and plan of treatment:	Patient should transition to regular activity level. Resume regular diet. Patient should follow up with her OB for a postpartum checkup 4-6 weeks after discharge from the hospital. Patient should call her doctor sooner if she develops a fever or uncontrolled vaginal bleeding.

## 2019-05-23 NOTE — DISCHARGE NOTE OB - PATIENT PORTAL LINK FT
You can access the [facility] Portal, offered by [facility], by registering with the following website: [email or web address]

## 2019-05-23 NOTE — PROGRESS NOTE ADULT - SUBJECTIVE AND OBJECTIVE BOX
S: Patient doing well. Minimal lochia. No complaints.    O: Vital Signs Last 24 Hrs  T(C): 36.8 (23 May 2019 20:03), Max: 36.9 (23 May 2019 03:14)  T(F): 98.2 (23 May 2019 20:03), Max: 98.42 (23 May 2019 03:14)  HR: 74 (23 May 2019 20:03) (65 - 93)  BP: 133/74 (23 May 2019 20:03) (112/60 - 138/79)  BP(mean): --  RR: 20 (23 May 2019 20:03) (17 - 20)  SpO2: 98% (23 May 2019 07:12) (92% - 99%)    Gen: NAD  Breast : Breast feeding  Abd: soft, NT, ND, fundus firm below umbilicus  Ext: no tenderness    Labs:                        11.3   9.8   )-----------( 255      ( 22 May 2019 10:13 )             34.6          A/P PP # 0  Doing well.  Routine post partum care.  Discharge home in AM.

## 2019-05-23 NOTE — DISCHARGE NOTE OB - PLAN OF CARE
Delivery Patient should transition to regular activity level. Resume regular diet. Patient should follow up with her OB for a postpartum checkup 4-6 weeks after discharge from the hospital. Patient should call her doctor sooner if she develops a fever or uncontrolled vaginal bleeding.

## 2019-05-23 NOTE — DISCHARGE NOTE OB - MEDICATION SUMMARY - MEDICATIONS TO TAKE
I will START or STAY ON the medications listed below when I get home from the hospital:    prenatal vitamins  -- 1 tab(s) by mouth once a day  -- Indication: For vitamins    ibuprofen 600 mg oral tablet  -- 1 tab(s) by mouth every 6 hours   -- Do not take this drug if you are pregnant.  It is very important that you take or use this exactly as directed.  Do not skip doses or discontinue unless directed by your doctor.  May cause drowsiness or dizziness.  Obtain medical advice before taking any non-prescription drugs as some may affect the action of this medication.  Take with food or milk.    -- Indication: For pain    Synthroid 75 mcg (0.075 mg) oral tablet  -- 1 tab(s) by mouth once a day  -- Indication: For Hypothyroid

## 2019-05-23 NOTE — CHART NOTE - NSCHARTNOTEFT_GEN_A_CORE
Patient seen and examined  Increased pain 6.5/10    Vital Signs Last 24 Hrs  T(C): 36.9 (23 May 2019 03:14), Max: 37.1 (22 May 2019 08:55)  T(F): 98.42 (23 May 2019 03:14), Max: 98.8 (22 May 2019 08:55)  HR: 82 (23 May 2019 03:14) (76 - 84)  BP: 126/69 (23 May 2019 03:14) (120/68 - 126/69)  BP(mean): --  RR: 17 (23 May 2019 03:14) (17 - 18)  SpO2: --      VE: 2cm/60/-3 station by Dr. Díaz   ctx: q4 min    Sono done by Dr. Díaz: vertex, Asynclitic presentation    A/P:   - 1st dose of cytotec 60 given, was previously held due to increased ctx pattern  - patient adjusted in bed  - noted to have asynclitic presentation on sono, position adjusted in bed by attending  - will continue to monitor progress Patient seen and examined  Increased pain 6.5/10    Vital Signs Last 24 Hrs  T(C): 36.9 (23 May 2019 03:14), Max: 37.1 (22 May 2019 08:55)  T(F): 98.42 (23 May 2019 03:14), Max: 98.8 (22 May 2019 08:55)  HR: 82 (23 May 2019 03:14) (76 - 84)  BP: 126/69 (23 May 2019 03:14) (120/68 - 126/69)  BP(mean): --  RR: 17 (23 May 2019 03:14) (17 - 18)  SpO2: --      VE: 2cm/60/-3 station by Dr. Díaz   ctx: q4 min    Sono done by Dr. Díaz: vertex, Asynclitic presentation    A/P:   - 1st dose of cytotec 60 given, was previously held due to increased ctx pattern  - patient adjusted in bed  - noted to have asynclitic presentation on sono, position adjusted in bed by attending  - will continue to monitor progress  - stadol 1mg ivp x1

## 2019-05-23 NOTE — DISCHARGE NOTE OB - CARE PROVIDER_API CALL
Kell Blanchard)  Obstetrics and Gynecology  [address] [place] [address]  Phone: [phone]  Fax: [phone]  Follow Up Time:

## 2019-05-23 NOTE — OB PROVIDER DELIVERY SUMMARY - NSPROVIDERDELIVERYNOTE_OBGYN_ALL_OB_FT
Pt quickly progressed to fully dilated and pushed well. AROM clear fluid at 0 station. Head delivered in controlled fashion. Anterior shoulder delivered and body delivered without complications. Baby boy was placed on mother's abdomen. 30 sec delayed cord clamping, short cord noted. Pitocin was started. Placenta delivered spontaneously and was inspected and noted to be intact. Pt had a boggy uterus and bimanual massage was preformed and methergin IM was given. Clots were evacuated and after 1 stitch was placed through the 1st degree perineal lacteration she was noted to be firm with out bleeding.     Viable male infant apgar 9//9.

## 2019-05-24 LAB
HCT VFR BLD CALC: 34.7 % — LOW (ref 37–47)
HGB BLD-MCNC: 11.3 G/DL — LOW (ref 12–16)

## 2019-05-24 RX ADMIN — Medication 1 TABLET(S): at 12:16

## 2019-05-24 RX ADMIN — Medication 600 MILLIGRAM(S): at 00:44

## 2019-05-24 RX ADMIN — Medication 600 MILLIGRAM(S): at 05:25

## 2019-05-24 RX ADMIN — Medication 600 MILLIGRAM(S): at 12:16

## 2019-05-24 RX ADMIN — Medication 600 MILLIGRAM(S): at 13:15

## 2019-05-24 RX ADMIN — Medication 600 MILLIGRAM(S): at 06:20

## 2019-05-24 RX ADMIN — Medication 975 MILLIGRAM(S): at 09:10

## 2019-05-24 RX ADMIN — Medication 75 MICROGRAM(S): at 05:25

## 2019-05-24 RX ADMIN — Medication 600 MILLIGRAM(S): at 19:56

## 2019-05-24 RX ADMIN — Medication 975 MILLIGRAM(S): at 10:10

## 2019-05-24 RX ADMIN — Medication 600 MILLIGRAM(S): at 19:02

## 2019-05-24 NOTE — PROGRESS NOTE ADULT - ASSESSMENT
39y year old  s/p  at 39 2/7wks gestation PPD#1. Doing well at this time    Vaginal Delivery  C/w postpartum care  Encourage ambulation & hydration  Encourage breastfeeding, mother/baby interaction  Pain management PRN  Plan for discharge on PPD 2 unless otherwise specified, with postpartum follow up at the office in 3-4 weeks upon discharge.  DVT prophylaxis: early ambulation.

## 2019-05-24 NOTE — PROGRESS NOTE ADULT - SUBJECTIVE AND OBJECTIVE BOX
39y year old  s/p  at 39 2/7wks gestation PPD#1.   Patient seen and examined at bedside, no acute overnight events.   Patient is ambulating, +eating, +PO hydration, +voiding, +Flatus, -BM, +Breast feeding and pain is well controlled.  Denies headache, SOB, fever, chills and calf pain.    VS:   Vital Signs Last 24 Hrs  T(C): 36.8 (23 May 2019 20:03), Max: 36.8 (23 May 2019 07:22)  T(F): 98.2 (23 May 2019 20:03), Max: 98.24 (23 May 2019 07:22)  HR: 74 (23 May 2019 20:03) (66 - 93)  BP: 133/74 (23 May 2019 20:03) (112/60 - 138/79)  BP(mean): --  RR: 20 (23 May 2019 20:03) (18 - 20)  SpO2: 98% (23 May 2019 07:12) (93% - 99%)    Physical Exam:  General: NAD  CVS: RRR, +S1/S2  Lungs: CTAB, no wheeze, rhonchi or rales.   Abdomen: +BS, soft, ND, minimally tender, Fundus firm at level of umbilicus.   Pelvic: Minimal lochia  Ext: No cyanosis, edema or calf tenderness.     Labs:                        11.3   9.8   )-----------( 255      ( 22 May 2019 10:13 )             34.6     Urinalysis Basic - ( 22 May 2019 10:13 )    Color: Yellow / Appearance: Clear / S.005 / pH: x  Gluc: x / Ketone: Negative  / Bili: Negative / Urobili: Negative mg/dL   Blood: x / Protein: Negative mg/dL / Nitrite: Negative   Leuk Esterase: Trace / RBC: Negative /HPF / WBC 3-5   Sq Epi: x / Non Sq Epi: Occasional / Bacteria: Negative        Medication:  MEDICATIONS  (STANDING):  acetaminophen   Tablet .. 975 milliGRAM(s) Oral every 6 hours  diphtheria/tetanus/pertussis (acellular) Vaccine (ADAcel) 0.5 milliLiter(s) IntraMuscular once  ibuprofen  Tablet. 600 milliGRAM(s) Oral every 6 hours  levothyroxine 75 MICROGram(s) Oral daily  oxytocin Infusion 333.333 milliUNIT(s)/Min (1000 mL/Hr) IV Continuous <Continuous>  prenatal multivitamin 1 Tablet(s) Oral daily  sodium chloride 0.9% lock flush 3 milliLiter(s) IV Push every 8 hours    MEDICATIONS  (PRN):  benzocaine 20%/menthol 0.5% Spray 1 Spray(s) Topical every 6 hours PRN for Perineal discomfort  dibucaine 1% Ointment 1 Application(s) Topical every 6 hours PRN Perineal discomfort  diphenhydrAMINE 25 milliGRAM(s) Oral every 6 hours PRN Pruritus  docusate sodium 100 milliGRAM(s) Oral two times a day PRN For stool softening  glycerin Suppository - Adult 1 Suppository(s) Rectal at bedtime PRN Constipation  hydrocortisone 1% Cream 1 Application(s) Topical every 6 hours PRN Moderate Pain (4-6)  lanolin Ointment 1 Application(s) Topical every 6 hours PRN nipple soreness  magnesium hydroxide Suspension 30 milliLiter(s) Oral two times a day PRN Constipation  pramoxine 1%/zinc 5% Cream 1 Application(s) Topical every 4 hours PRN Moderate Pain (4-6)  simethicone 80 milliGRAM(s) Chew every 4 hours PRN Gas  witch hazel Pads 1 Application(s) Topical every 4 hours PRN Perineal discomfort

## 2019-05-25 VITALS
DIASTOLIC BLOOD PRESSURE: 86 MMHG | OXYGEN SATURATION: 100 % | TEMPERATURE: 97 F | SYSTOLIC BLOOD PRESSURE: 134 MMHG | RESPIRATION RATE: 17 BRPM | HEART RATE: 74 BPM

## 2019-05-25 RX ORDER — IBUPROFEN 200 MG
1 TABLET ORAL
Qty: 28 | Refills: 0
Start: 2019-05-25 | End: 2019-05-31

## 2019-05-25 RX ADMIN — Medication 600 MILLIGRAM(S): at 13:00

## 2019-05-25 RX ADMIN — Medication 600 MILLIGRAM(S): at 06:19

## 2019-05-25 RX ADMIN — Medication 75 MICROGRAM(S): at 05:49

## 2019-05-25 RX ADMIN — Medication 600 MILLIGRAM(S): at 12:12

## 2019-05-25 RX ADMIN — Medication 600 MILLIGRAM(S): at 05:49

## 2019-05-25 RX ADMIN — Medication 1 TABLET(S): at 12:12

## 2019-05-25 NOTE — PROGRESS NOTE ADULT - SUBJECTIVE AND OBJECTIVE BOX
A/P 39y G P PPD#2 s/p , stable    Vital Signs Last 24 Hrs  T(C): 37 (24 May 2019 20:43), Max: 37.1 (24 May 2019 08:00)  T(F): 98.6 (24 May 2019 20:43), Max: 98.8 (24 May 2019 08:00)  HR: 87 (24 May 2019 20:43) (84 - 87)  BP: 125/69 (24 May 2019 20:43) (125/69 - 125/77)  BP(mean): --  RR: 18 (24 May 2019 20:43) (18 - 18)  SpO2: --    PHYSICAL EXAM:    CHEST/LUNG: Clear to percussion bilaterally; No rales, rhonchi, wheezing, or rubs  HEART: Regular rate and rhythm; No murmurs, rubs, or gallops  BREASTS: Not engorged nipples everted  ABDOMEN: Soft, Nontender, Nondistended; Bowel sounds present  PERINEUM: Intact  and lochia minimal  EXTREMITIES:  2+ Peripheral Pulses, No clubbing, cyanosis, or edema    MEDICATIONS  (STANDING):  acetaminophen   Tablet .. 975 milliGRAM(s) Oral every 6 hours  diphtheria/tetanus/pertussis (acellular) Vaccine (ADAcel) 0.5 milliLiter(s) IntraMuscular once  ibuprofen  Tablet. 600 milliGRAM(s) Oral every 6 hours  levothyroxine 75 MICROGram(s) Oral daily  oxytocin Infusion 333.333 milliUNIT(s)/Min (1000 mL/Hr) IV Continuous <Continuous>  prenatal multivitamin 1 Tablet(s) Oral daily  sodium chloride 0.9% lock flush 3 milliLiter(s) IV Push every 8 hours    MEDICATIONS  (PRN):  benzocaine 20%/menthol 0.5% Spray 1 Spray(s) Topical every 6 hours PRN for Perineal discomfort  dibucaine 1% Ointment 1 Application(s) Topical every 6 hours PRN Perineal discomfort  diphenhydrAMINE 25 milliGRAM(s) Oral every 6 hours PRN Pruritus  docusate sodium 100 milliGRAM(s) Oral two times a day PRN For stool softening  glycerin Suppository - Adult 1 Suppository(s) Rectal at bedtime PRN Constipation  hydrocortisone 1% Cream 1 Application(s) Topical every 6 hours PRN Moderate Pain (4-6)  lanolin Ointment 1 Application(s) Topical every 6 hours PRN nipple soreness  magnesium hydroxide Suspension 30 milliLiter(s) Oral two times a day PRN Constipation  pramoxine 1%/zinc 5% Cream 1 Application(s) Topical every 4 hours PRN Moderate Pain (4-6)  simethicone 80 milliGRAM(s) Chew every 4 hours PRN Gas  witch hazel Pads 1 Application(s) Topical every 4 hours PRN Perineal discomfort        LABS:                        11.3   x     )-----------( x        ( 24 May 2019 07:06 )             34.7       1. Pain: well controlled on OPM  2. GI: Regular diet  3. : voiding  4. DVT prophylaxis: ambulate  5. Dispo: PPD 2, unless otherwise specified

## 2019-07-10 PROCEDURE — 86850 RBC ANTIBODY SCREEN: CPT

## 2019-07-10 PROCEDURE — 86900 BLOOD TYPING SEROLOGIC ABO: CPT

## 2019-07-10 PROCEDURE — 85018 HEMOGLOBIN: CPT

## 2019-07-10 PROCEDURE — 85014 HEMATOCRIT: CPT

## 2019-07-10 PROCEDURE — G0463: CPT

## 2019-07-10 PROCEDURE — 81001 URINALYSIS AUTO W/SCOPE: CPT

## 2019-07-10 PROCEDURE — 86780 TREPONEMA PALLIDUM: CPT

## 2019-07-10 PROCEDURE — 86901 BLOOD TYPING SEROLOGIC RH(D): CPT

## 2019-07-10 PROCEDURE — 59050 FETAL MONITOR W/REPORT: CPT

## 2019-07-10 PROCEDURE — 85027 COMPLETE CBC AUTOMATED: CPT

## 2019-07-10 PROCEDURE — 36415 COLL VENOUS BLD VENIPUNCTURE: CPT

## 2019-07-10 PROCEDURE — 59025 FETAL NON-STRESS TEST: CPT

## 2019-07-10 PROCEDURE — T1013: CPT

## 2019-07-10 PROCEDURE — 86765 RUBEOLA ANTIBODY: CPT

## 2023-09-28 NOTE — OB PROVIDER IHI INDUCTION/AUGMENTATION NOTE - NS_OBIHIATTENDATTEST_OBGYN_ALL_OB
What Type Of Note Output Would You Prefer (Optional)?: Standard Output What Is The Reason For Today's Visit?: Full Body Skin Examination What Is The Reason For Today's Visit? (Being Monitored For X): concerning skin lesions on an annual basis I have reviewed the history and the physical examination of the patient, as well as the assessment and plan, as was entered by the resident physician or the mid-level provider. I agree with the plan to induce or augment labor, and in my opinion, at this time, the use of Pitocin, and/or other induction agent(s), is safe and beneficial to mother and fetus.